# Patient Record
Sex: FEMALE | URBAN - METROPOLITAN AREA
[De-identification: names, ages, dates, MRNs, and addresses within clinical notes are randomized per-mention and may not be internally consistent; named-entity substitution may affect disease eponyms.]

---

## 2018-12-05 ENCOUNTER — TELEPHONE (OUTPATIENT)
Dept: OTHER | Facility: CLINIC | Age: 18
End: 2018-12-05

## 2018-12-05 NOTE — TELEPHONE ENCOUNTER
12/5/2018    Call Regarding [employer] other    Attempt 1    Message spoke with patient     Comments: patient would like a callback       Outreach   Jackeline Mcfarlane